# Patient Record
Sex: MALE | Race: WHITE | Employment: FULL TIME | URBAN - METROPOLITAN AREA
[De-identification: names, ages, dates, MRNs, and addresses within clinical notes are randomized per-mention and may not be internally consistent; named-entity substitution may affect disease eponyms.]

---
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
## 2020-09-12 ENCOUNTER — HOSPITAL ENCOUNTER (EMERGENCY)
Age: 20
Discharge: HOME | End: 2020-09-12
Payer: COMMERCIAL

## 2020-09-12 VITALS
RESPIRATION RATE: 16 BRPM | SYSTOLIC BLOOD PRESSURE: 139 MMHG | OXYGEN SATURATION: 97 % | HEIGHT: 69 IN | DIASTOLIC BLOOD PRESSURE: 78 MMHG | HEART RATE: 78 BPM | BODY MASS INDEX: 32.58 KG/M2 | WEIGHT: 220 LBS | TEMPERATURE: 98.6 F

## 2020-09-12 PROCEDURE — 99203 OFFICE O/P NEW LOW 30 MIN: CPT

## 2020-09-12 PROCEDURE — 99202 OFFICE O/P NEW SF 15 MIN: CPT | Performed by: NURSE PRACTITIONER

## 2020-09-12 ASSESSMENT — ENCOUNTER SYMPTOMS
ABDOMINAL PAIN: 0
SORE THROAT: 0
VOMITING: 0
COUGH: 0
DIARRHEA: 0
NAUSEA: 0
CRAMPS: 1
RECENT COUGH: 0

## 2020-09-12 NOTE — ED PROVIDER NOTES
[place] [address]  Urgent Care Encounter       CHIEF COMPLAINT       Chief Complaint   Patient presents with    Abdominal Cramping    Letter for School/Work       Nurses Notes reviewed and I agree except as noted in the HPI. HISTORY OF PRESENT ILLNESS   Rigo Clark is a 23 y.o. male who presents urgent care center complaining of abdominal cramping. Patient states yesterday had abdominal pain and cramping he did have some diarrhea 4 times yesterday which is since resolved. He denied any vomiting or nausea. The patient states that he has not had any fever or chills. He stated that the cramping has resolved and he has not had any diarrhea. The patient states she does need a work slip because he was unable to go to work today. At the present time patient is sitting on the table skin is warm and dry patient denies any pain and the patient does not appear to be in any acute distress. The history is provided by the patient. No  was used. Abdominal Cramping   This is a new problem. The current episode started yesterday. The problem occurs rarely. The problem has been rapidly improving. Pertinent negatives include no abdominal pain and no headaches. Nothing aggravates the symptoms. Nothing relieves the symptoms. He has tried nothing for the symptoms. The treatment provided no relief. Diarrhea   Quality:  Semi-solid and watery  Severity:  Mild  Onset quality:  Sudden  Number of episodes:  X 4 yesterday  Duration:  1 day  Timing:  Intermittent  Progression:  Resolved  Relieved by:  Nothing  Worsened by:  Nothing  Ineffective treatments:  None tried  Associated symptoms: no abdominal pain, no arthralgias, no chills, no recent cough, no diaphoresis, no fever, no headaches, no myalgias and no vomiting        REVIEW OF SYSTEMS     Review of Systems   Constitutional: Positive for appetite change. Negative for activity change, chills, diaphoresis, fatigue and fever. HENT: Negative for congestion and sore throat. Respiratory: Negative for cough. Gastrointestinal: Negative for abdominal pain, diarrhea, nausea and vomiting. Genitourinary: Negative for difficulty urinating, discharge, hematuria, scrotal swelling and testicular pain. Musculoskeletal: Negative for arthralgias, joint swelling and myalgias. Neurological: Negative for light-headedness and headaches. PAST MEDICAL HISTORY   History reviewed. No pertinent past medical history. SURGICALHISTORY     Patient  has a past surgical history that includes Mouth surgery; Foot surgery (2010); and Myringotomy Tympanostomy Tube Placement (Bilateral, 2007). CURRENT MEDICATIONS       Previous Medications    No medications on file       ALLERGIES     Patient is is allergic to latex. Patients   There is no immunization history on file for this patient. FAMILY HISTORY     Patient's family history includes High Blood Pressure in his father and mother; Other in his mother; Thyroid Disease in his mother. SOCIAL HISTORY     Patient  reports that he has never smoked. He has never used smokeless tobacco. He reports that he does not drink alcohol or use drugs. PHYSICAL EXAM     ED TRIAGE VITALS  BP: 139/78, Temp: 98.6 °F (37 °C), Heart Rate: 78, Resp: 16, SpO2: 97 %,Estimated body mass index is 32.49 kg/m² as calculated from the following:    Height as of this encounter: 5' 9\" (1.753 m). Weight as of this encounter: 220 lb (99.8 kg). ,No LMP for male patient. Physical Exam  Vitals signs and nursing note reviewed. Constitutional:       General: He is not in acute distress. Appearance: Normal appearance. He is well-developed. He is not ill-appearing, toxic-appearing or diaphoretic. HENT:      Head: Normocephalic and atraumatic.       Right Ear: External ear normal.      Left Ear: External ear normal.      Nose: Nose normal.      Mouth/Throat:      Mouth: Mucous membranes are moist.   Neck: Musculoskeletal: Normal range of motion. Cardiovascular:      Rate and Rhythm: Normal rate. Pulmonary:      Effort: Pulmonary effort is normal.   Abdominal:      General: Bowel sounds are normal. There is no distension. Palpations: Abdomen is soft. There is no mass. Tenderness: There is no abdominal tenderness. There is no right CVA tenderness, guarding or rebound. Hernia: No hernia is present. Musculoskeletal:         General: No swelling. Skin:     General: Skin is warm and dry. Capillary Refill: Capillary refill takes less than 2 seconds. Neurological:      Mental Status: He is alert and oriented to person, place, and time. Sensory: No sensory deficit. Psychiatric:         Mood and Affect: Mood normal.         Behavior: Behavior normal. Behavior is cooperative. DIAGNOSTIC RESULTS     Labs:No results found for this visit on 09/12/20. IMAGING:    No orders to display         EKG:      URGENT CARE COURSE:     Vitals:    09/12/20 1300 09/12/20 1303   BP:  139/78   Pulse: 78    Resp: 16    Temp: 98.6 °F (37 °C)    TempSrc: Temporal    SpO2: 97%    Weight: 220 lb (99.8 kg)    Height: 5' 9\" (1.753 m)        Medications - No data to display         PROCEDURES:  None    FINAL IMPRESSION      1. Abdominal cramping, generalized    2. Diarrhea, unspecified type          DISPOSITION/ PLAN      I did discuss with Patient/patient's representative physical findings, vital signs, clinical data obtained and feel at this time the patient can be discharged to outpatient status with conservative management. I see nothing that would suggest an acute abdomen at this time. Patient/patient's representative was advised to monitor fever, development of pain,change in pain dizziness or lightheadedness they're to dial 911 or go directly to the emergency department for reevaluation and further management.      The patient/Patient representative was also advised to monitor for any bloating or distention of the abdomen any hematemesis or melana or bloody stools. They're advised to monitor urine output. The patient/patient's representative are agreeable to the treatment plan at this time the patient does live with family members in stable condition. The patient was advised to follow-up with her primary care provider in 24-48 hours for reevaluation. PATIENT REFERRED TO:  Elgin Buckley MD  [address] [place] [address] / SHAY CASTRO [facility] [id number]      DISCHARGE MEDICATIONS:  New Prescriptions    No medications on file       Discontinued Medications    No medications on file       There are no discharge medications for this patient.       AYAN Blankenship CNP    (Please note that portions of this note were completed with a voice recognition program. Efforts were made to edit the dictations but occasionally words are mis-transcribed.)           AYAN Blankenship CNP  09/12/20 [id number]

## 2020-09-12 NOTE — ED TRIAGE NOTES
Patient to room with c/o abdominal cramping and diarrhea beginning yesterday. Symptoms since resolved. Requests work excuse.

## 2020-09-12 NOTE — LETTER
[facility]  [address] [place] [id number]  Phone: [phone]               September 12, 2020    Patient: Dianna Abernathy   YOB: 2000   Date of Visit: 9/12/2020       To Whom It May Concern:    Rigo Jenkins was seen and treated in our emergency department on 9/12/2020. He may return to work on 9/13/2020.       Sincerely,       AYAN Whyte CNP         Signature:___Electronically signed by AYAN Whyte CNP on 9/12/2020 at 1:41 PM  _______________________________

## 2021-02-22 ENCOUNTER — VIRTUAL VISIT (OUTPATIENT)
Dept: FAMILY MEDICINE CLINIC | Age: 21
End: 2021-02-22

## 2021-02-22 DIAGNOSIS — K52.9 ACUTE GASTROENTERITIS: Primary | ICD-10-CM

## 2021-02-22 PROCEDURE — 99203 OFFICE O/P NEW LOW 30 MIN: CPT | Performed by: FAMILY MEDICINE

## 2021-02-22 SDOH — ECONOMIC STABILITY: INCOME INSECURITY: HOW HARD IS IT FOR YOU TO PAY FOR THE VERY BASICS LIKE FOOD, HOUSING, MEDICAL CARE, AND HEATING?: NOT HARD AT ALL

## 2021-02-22 SDOH — ECONOMIC STABILITY: TRANSPORTATION INSECURITY
IN THE PAST 12 MONTHS, HAS THE LACK OF TRANSPORTATION KEPT YOU FROM MEDICAL APPOINTMENTS OR FROM GETTING MEDICATIONS?: NO

## 2021-02-22 SDOH — ECONOMIC STABILITY: TRANSPORTATION INSECURITY
IN THE PAST 12 MONTHS, HAS LACK OF TRANSPORTATION KEPT YOU FROM MEETINGS, WORK, OR FROM GETTING THINGS NEEDED FOR DAILY LIVING?: NO

## 2021-02-22 SDOH — ECONOMIC STABILITY: FOOD INSECURITY: WITHIN THE PAST 12 MONTHS, YOU WORRIED THAT YOUR FOOD WOULD RUN OUT BEFORE YOU GOT MONEY TO BUY MORE.: NEVER TRUE

## 2021-02-22 ASSESSMENT — ENCOUNTER SYMPTOMS
CONSTIPATION: 0
EYE PAIN: 0
COUGH: 0
SHORTNESS OF BREATH: 0
ABDOMINAL PAIN: 0
CHEST TIGHTNESS: 0
BLOOD IN STOOL: 0
SORE THROAT: 0
BACK PAIN: 0
VOMITING: 1
TROUBLE SWALLOWING: 0
NAUSEA: 0

## 2021-02-22 ASSESSMENT — PATIENT HEALTH QUESTIONNAIRE - PHQ9
SUM OF ALL RESPONSES TO PHQ QUESTIONS 1-9: 0
SUM OF ALL RESPONSES TO PHQ QUESTIONS 1-9: 0
2. FEELING DOWN, DEPRESSED OR HOPELESS: 0

## 2021-02-22 NOTE — PROGRESS NOTES
Subjective:      Patient ID: Josefa Ornelas is a 21 y.o. male. Patient verified Video Chat was not encrypted, and agrees to the Video Exam in presence of nurse. Diarrhea  On  Saturday  and  Sunday 2-21  And  No  Emesis  On  2-21-     But  Diarrhea       Keeping    Fluids    Down  No issues  today         Works  3 pm   To  11 and  No work on  Sunday     Emesis   This is a new (on    2-20 with  illness) problem. The problem occurs 2 to 4 times per day. There has been no fever. Pertinent negatives include no abdominal pain, arthralgias, chest pain, coughing, dizziness, fever or headaches. Associated symptoms comments:   N/v on  2-20-21. He has tried acetaminophen for the symptoms. No past medical history on file.   Past Surgical History:   Procedure Laterality Date    FOOT SURGERY  2010    cut muscles  in calves    MOUTH SURGERY      MYRINGOTOMY AND TYMPANOSTOMY TUBE PLACEMENT Bilateral 2007     Social History     Socioeconomic History    Marital status: Single     Spouse name: Not on file    Number of children: Not on file    Years of education: Not on file    Highest education level: Not on file   Occupational History    Not on file   Social Needs    Financial resource strain: Not hard at all    Food insecurity     Worry: Never true     Inability: Never true   [employer] needs     Medical: No     Non-medical: No   Tobacco Use    Smoking status: Never Smoker    Smokeless tobacco: Never Used   Substance and Sexual Activity    Alcohol use: No    Drug use: No    Sexual activity: Never   Lifestyle    Physical activity     Days per week: Not on file     Minutes per session: Not on file    Stress: Not on file   Relationships    Social connections     Talks on phone: Not on file     Gets together: Not on file     Attends Nondenominational service: Not on file     Active member of club or organization: Not on file     Attends meetings of clubs or organizations: Not on file Relationship status: Not on file    Intimate partner violence     Fear of current or ex partner: Not on file     Emotionally abused: Not on file     Physically abused: Not on file     Forced sexual activity: Not on file   Other Topics Concern    Not on file   Social History Narrative    Not on file     Family History   Problem Relation Age of Onset    High Blood Pressure Mother     Other Mother         COLON SURGERY    Thyroid Disease Mother         TOTAL THYROIDECTOMY    High Blood Pressure Father        Review of Systems   Constitutional: Negative for fatigue and fever. HENT: Negative for congestion, ear pain, postnasal drip, sore throat and trouble swallowing. Eyes: Negative for pain. Respiratory: Negative for cough, chest tightness and shortness of breath. Cardiovascular: Negative for chest pain, palpitations and leg swelling. Gastrointestinal: Positive for vomiting. Negative for abdominal pain, blood in stool, constipation and nausea. Genitourinary: Negative for difficulty urinating, frequency and urgency. Musculoskeletal: Negative for arthralgias, back pain, joint swelling and neck stiffness. Skin: Negative for rash. Neurological: Negative for dizziness, weakness and headaches. Hematological: Negative for adenopathy. Does not bruise/bleed easily. Psychiatric/Behavioral: Negative for behavioral problems, dysphoric mood and sleep disturbance. Objective:   Physical Exam  Constitutional:       General: He is not in acute distress. Appearance: Normal appearance. He is not ill-appearing, toxic-appearing or diaphoretic. Neck:      Musculoskeletal: Normal range of motion. Musculoskeletal: Normal range of motion. Skin:     Findings: No rash. Neurological:      General: No focal deficit present. Mental Status: He is alert and oriented to person, place, and time. Cranial Nerves: No cranial nerve deficit. Sensory: No sensory deficit. Motor: No weakness. Coordination: Coordination normal.      Gait: Gait normal.      Deep Tendon Reflexes: Reflexes normal.         Assessment:       Diagnosis Orders   1. Acute gastroenteritis           Plan:      No current outpatient medications on file. No current facility-administered medications for this visit.            Feel  Not  covid   advance  Diet  slowly      off  Work  2-20 and  2-21 and  Not  Working  2-22 and  returnn  To  Work  2-23-21       Fax  Number    covid  Precautions    Video  15  mins  Marlen Jack MD

## 2021-02-22 NOTE — PROGRESS NOTES
Rigo agreed to Video Chat/Exam in presence of Dr Ana Cristina Abbott and myself. Verified who was present in room with Rigo. Rigo informed the e-mail address used to Face Time cannot be used to contact the Provider, if they are any questions or concerns they need to call the office directly. Rigo stated understanding.

## 2021-02-22 NOTE — LETTER
[facility]  [facility] [id number]  Phone: [phone]  Fax: [phone]    Marlen Jack MD        February 22, 2021     Patient: Benita Barrientos   YOB: 2000   Date of Visit: 2/22/2021       To Whom It May Concern: It is my medical opinion that [place] [employer] may return to work on 2-23-21 as off  2-20 and  2-21-21. If you have any questions or concerns, please don't hesitate to call.     Sincerely,        Marlen Jack MD